# Patient Record
Sex: FEMALE | Race: WHITE | NOT HISPANIC OR LATINO | URBAN - METROPOLITAN AREA
[De-identification: names, ages, dates, MRNs, and addresses within clinical notes are randomized per-mention and may not be internally consistent; named-entity substitution may affect disease eponyms.]

---

## 2019-08-07 ENCOUNTER — EMERGENCY (EMERGENCY)
Facility: HOSPITAL | Age: 29
LOS: 1 days | Discharge: HOME | End: 2019-08-07
Attending: EMERGENCY MEDICINE
Payer: COMMERCIAL

## 2019-08-07 VITALS
WEIGHT: 154.98 LBS | OXYGEN SATURATION: 99 % | HEIGHT: 62 IN | HEART RATE: 84 BPM | RESPIRATION RATE: 18 BRPM | DIASTOLIC BLOOD PRESSURE: 71 MMHG | TEMPERATURE: 99 F | SYSTOLIC BLOOD PRESSURE: 112 MMHG

## 2019-08-07 PROCEDURE — 73610 X-RAY EXAM OF ANKLE: CPT

## 2019-08-07 PROCEDURE — 73610 X-RAY EXAM OF ANKLE: CPT | Mod: 26,LT

## 2019-08-07 PROCEDURE — 99283 EMERGENCY DEPT VISIT LOW MDM: CPT

## 2019-08-07 NOTE — ED ADULT NURSE NOTE - CHIEF COMPLAINT QUOTE
I rolled my ankle walking down the step in [place] [address] at 2220, reporting left ankle pain as minimal 1/10, mild swelling noted to site, no obvious deformity noted, pt ambulating with limp in triage

## 2019-08-07 NOTE — ED PROVIDER NOTE - ATTENDING CONTRIBUTION TO CARE
I personally saw the patient with the PA, and completed the key components of the history and physical exam. I then discussed the management plan with the PA.  gen in nad resp clear cardiac no murmur abd soft msk + ttp left lat mall achilles intact nml distal pulses   rice therapy risk of occult fx explained

## 2019-08-07 NOTE — ED PROVIDER NOTE - CLINICAL SUMMARY MEDICAL DECISION MAKING FREE TEXT BOX
Pt comes in s/p rolling her ankle, no fracture seen on xray. Pt given aircast and follow up with orthopedics.

## 2019-08-07 NOTE — ED ADULT TRIAGE NOTE - CHIEF COMPLAINT QUOTE
I rolled my ankle walking down the step in [place] [address] at 2220, reporting left ankle pain as minimal 1/10, mild swelling noted to site, no obvious deformity noted, pt ambulating with limp in triage

## 2019-08-07 NOTE — ED ADULT NURSE NOTE - OBJECTIVE STATEMENT
pt was on phone at [facility], did not realize how steep a step was and rolled ankle.  pt is able to wiggle toes and skin is warm to touch with good cap refill.  pt resting in bed family at bedside.

## 2019-08-07 NOTE — ED PROVIDER NOTE - OBJECTIVE STATEMENT
30yo female with PMHx of asthma comes in due to ankle pain. Pt reports around 10pm she was walking downstairs at [employer] and rolled her right ankle. Pt now complaining of pain and swelling to the lateral aspect of the ankle. Pt has taken Ibuprofen for the pain. Pt denies DROM, decreased sensation. 28yo female with PMHx of asthma comes in due to ankle pain. Pt reports around 10pm [place] [employer] and [place]. Pt now complaining of pain and swelling to the lateral aspect of the ankle. Pt has taken Ibuprofen for the pain. Pt denies DROM, decreased sensation.

## 2019-08-07 NOTE — ED PROVIDER NOTE - PHYSICAL EXAMINATION
Right foot: no deformity, swelling around the lateral malleolus, no pain on palpation of foot or medial malleolus, pain on palpation of lateral malleolus, FROM- flexion and extension, distal sensation intact, DP/PT pulse 2+ Left foot: no deformity, swelling around the lateral malleolus, no pain on palpation of foot or medial malleolus, pain on palpation of lateral malleolus, FROM- flexion and extension, distal sensation intact, DP/PT pulse 2+

## 2019-08-07 NOTE — ED PROCEDURE NOTE - ATTENDING CONTRIBUTION TO CARE
I personally saw the patient with the PA, and completed the key components of the history and physical exam. I then discussed the management plan with the PA.   agree with pa plan of care

## 2020-04-26 ENCOUNTER — MESSAGE (OUTPATIENT)
Age: 30
End: 2020-04-26

## 2020-05-05 ENCOUNTER — APPOINTMENT (OUTPATIENT)
Dept: DISASTER EMERGENCY | Facility: HOSPITAL | Age: 30
End: 2020-05-05

## 2020-05-06 LAB
SARS-COV-2 IGG SERPL IA-ACNC: 2.7 AU/ML
SARS-COV-2 IGG SERPL QL IA: NEGATIVE
